# Patient Record
Sex: MALE | Race: WHITE
[De-identification: names, ages, dates, MRNs, and addresses within clinical notes are randomized per-mention and may not be internally consistent; named-entity substitution may affect disease eponyms.]

---

## 2019-10-22 ENCOUNTER — HOSPITAL ENCOUNTER (EMERGENCY)
Dept: HOSPITAL 74 - FER | Age: 24
Discharge: HOME | End: 2019-10-22
Payer: COMMERCIAL

## 2019-10-22 VITALS — HEART RATE: 74 BPM | SYSTOLIC BLOOD PRESSURE: 123 MMHG | DIASTOLIC BLOOD PRESSURE: 80 MMHG | TEMPERATURE: 98.2 F

## 2019-10-22 VITALS — BODY MASS INDEX: 22.8 KG/M2

## 2019-10-22 DIAGNOSIS — L03.115: Primary | ICD-10-CM

## 2019-10-22 DIAGNOSIS — Z88.8: ICD-10-CM

## 2019-10-22 NOTE — PDOC
Documentation entered by Cammy Maurer SCRIBE, acting as scribe for Antony Salgado MD.








Antony Salgado MD:  This documentation has been prepared by the scribe, 

Cammy Maurer SCRIBE, under my direction and personally reviewed by me in 

its entirety.  I confirm that the documentation accurately reflects all work, 

treatment, procedures, and medical decision making performed by me.  





Attending Attestation





- Resident


Resident Name: Armen Ch





- ED Attending Attestation


I have performed the following: I have examined & evaluated the patient, The 

case was reviewed & discussed with the resident, I agree w/resident's findings 

& plan





- HPI


HPI: 





10/22/19 15:41


Healthy 24-year-old male with no significant past medical history (listed for 

haart medications but this was prophylaxis) presents with redness and swelling 

and discomfort to his right knee to 3 days.  Initially with pimple-like lesion, 

popped earlier this morning, but with expanding redness to the anterior knee 

and discomfort, no fevers or chills.  No visualized insect bites or injuries, 

no history of recurring skin infections.  No joint pain or swelling.





- Physicial Exam


PE: 





10/22/19 15:47


GENERAL: The patient is awake, alert, and fully oriented, in no acute distress.


HEAD:Normal with no signs of trauma.


EYES: Pupils equal, round and reactive to light, extraocular movements intact, 

sclera anicteric, conjunctiva clear.


EXTREMITIES: Normal range of motion, no edema.


NEUROLOGICAL: Normal speech, normal gait.


PSYCH: Normal mood, normal affect.


SKIN:+right knee 5-6 cm superficial cellulitis. Central 3 mm scab. No induration

, fluctuation or drainage. No joint effusion. Full flexion and extension. 

Neurovascular intact. 








- Medical Decision Making





10/22/19 15:42


A portion of this note was documented by [employer] under my direction. I 

have reviewed the details of the note, within reason, and agree with the 

documentation with the following case summary and management plan written by me.





24-year-old male with superficial cellulitis of right anterior knee/prepatellar 

space, no evidence of deep tissue infection and no current induration or 

abscess.  No evidence of joint involvement, no effusion and full range of 

motion and neurovascularly intact.  Vital signs stable without evidence of sirs.


Course of Bactrim


Understands return criteria

## 2019-10-22 NOTE — PDOC
History of Present Illness





- General


Chief Complaint: Pain, Acute


Stated Complaint: RIGHT KNEE REDNESS/PAIN


Time Seen by Provider: 10/22/19 14:54





- History of Present Illness


Initial Comments: 





Mr. Dale is a 23 y/o male with no significant PMH presenting with right knee 

pain, swelling, erythema, and tenderness that started last week. Denies fever, 

chills, nausea, vomiting, chest pain, shortness of breath, abdominal pain, 

changes in urine/stool.





Patient reports that he has full ROM of the right knee, but hurts when he is 

standing. 





Past History





- Past Medical History


Allergies/Adverse Reactions: 


 Allergies











Allergy/AdvReac Type Severity Reaction Status Date / Time


 


ibuprofen [From Advil] Allergy  Swelling Verified 10/22/19 14:45











Home Medications: 


Ambulatory Orders





Dolutegravir Sodium [Tivicay] 50 mg PO DAILY #28 tablet 05/01/19 


Emtricitabine/Tenofovir (Tdf) [Truvada 200 mg-300 mg Tablet] 1 each PO DAILY #

28 tablet 05/01/19 


Sulfamethoxazole/Trimethoprim [Bactrim Ds Tablet] 1 each PO BID 7 Days #13 

tablet 10/22/19 








Anemia: No


Asthma: No


COPD: No


Diabetes: No


HTN: No


Hypercholesterolemia: No


Liver Disease: No





- Psycho Social/Smoking Cessation Hx


Smoking History: Never smoked


Hx Alcohol Use: No


Drug/Substance Use Hx: No





**Review of Systems





- Review of Systems


Comments:: 





ROS


GENERAL/CONSTITUTIONAL: No fever or chills. No weakness._


HEAD, EYES, EARS, NOSE AND THROAT: No change in vision. No change in hearing. 

No sore throat._


CARDIOVASCULAR: No chest pain or shortness of breath_


RESPIRATORY: Denies cough, hemoptysis_


GASTROINTESTINAL: No nausea, vomiting, diarrhea or constipation._


GENITOURINARY: No dysuria, frequency, or change in urination._


MUSCULOSKELETAL: Reports right knee swelling, erythema, warm, pain and 

tenderness. No neck or back pain._


SKIN: No rash_


NEUROLOGIC: No headache, vertigo, loss of consciousness, or change in strength/

sensation._











*Physical Exam





- Vital Signs


 Last Vital Signs











Temp Pulse Resp BP Pulse Ox


 


 98.2 F   74   16   123/80   99 


 


 10/22/19 14:45  10/22/19 14:45  10/22/19 14:45  10/22/19 14:45  10/22/19 14:45














- Physical Exam


Comments: 








GENERAL: Awake, alert, and oriented to person/place/time, in no acute distress_


HEAD: No signs of trauma, normocephalic, atraumatic _


EYES: PERRLA, EOMI, sclera anicteric, conjunctiva clear_


ENT: Hearing grossly normal, nares patent, oropharynx clear without exudates. 

No uvular deviation. Moist mucosa_


NECK: Normal ROM, supple, no lymphadenopathy, JVD, or masses_


LUNGS: No distress, speaks in full sentences, clear to auscultation bilaterally 

_


HEART: Regular rate and rhythm, normal S1 and S2, no murmurs appreciated, 

peripheral pulses normal and equal bilaterally._


ABDOMEN: Soft, nontender.





EXTREMITIES: Right knee - full ROM, 5/5 flexion/extension of right hip and 

right knee and dorsi/plantar flexion of right ankle. TTP over the right knee, 

erythematous area measuring around 3 cm x 2 cm, warm to touch, no streaking. 





NEUROLOGICAL: Cranial nerves II through XII grossly intact. Normal speech, 

normal gait, no focal sensorimotor deficits _


SKIN: Warm, Dry, normal turgor. 











Medical Decision Making





- Medical Decision Making





10/22/19 15:15


24M with no significant PMH presenting with right knee pain, redness, and 

swelling that started 1 week ago. No systemic symptoms. Full ROM and strength/

sensation of RLE. 


Plan to d/c with home with Bactrim BID for 7 days, f/u PCP. 








Discharge





- Discharge Information


Problems reviewed: Yes


Clinical Impression/Diagnosis: 


 Cellulitis of right knee





Condition: Stable


Disposition: HOME





- Follow up/Referral


Referrals: 


Sánchez Kramer MD [Staff Physician] - 





- Patient Discharge Instructions


Additional Instructions: 


Please take Bactrim (antibiotic) 1 tab, two times per day for 7 days.





Please make an appointment to follow up with a primary care physician. A 

referral is placed here for you. 





- Post Discharge Activity